# Patient Record
Sex: MALE | Race: WHITE | URBAN - METROPOLITAN AREA
[De-identification: names, ages, dates, MRNs, and addresses within clinical notes are randomized per-mention and may not be internally consistent; named-entity substitution may affect disease eponyms.]

---

## 2022-10-31 ENCOUNTER — OFFICE VISIT (OUTPATIENT)
Dept: ORTHOPEDIC SURGERY | Age: 62
End: 2022-10-31
Payer: COMMERCIAL

## 2022-10-31 VITALS — HEIGHT: 70 IN | BODY MASS INDEX: 33.36 KG/M2 | WEIGHT: 233 LBS

## 2022-10-31 DIAGNOSIS — M72.0 DUPUYTREN'S DISEASE OF PALM OF RIGHT HAND: ICD-10-CM

## 2022-10-31 DIAGNOSIS — M65.352 TRIGGER FINGER, LEFT LITTLE FINGER: Primary | ICD-10-CM

## 2022-10-31 PROCEDURE — 99203 OFFICE O/P NEW LOW 30 MIN: CPT | Performed by: ORTHOPAEDIC SURGERY

## 2022-10-31 PROCEDURE — 20550 NJX 1 TENDON SHEATH/LIGAMENT: CPT | Performed by: ORTHOPAEDIC SURGERY

## 2022-10-31 RX ORDER — BETAMETHASONE SODIUM PHOSPHATE AND BETAMETHASONE ACETATE 3; 3 MG/ML; MG/ML
6 INJECTION, SUSPENSION INTRA-ARTICULAR; INTRALESIONAL; INTRAMUSCULAR; SOFT TISSUE ONCE
Status: COMPLETED | OUTPATIENT
Start: 2022-10-31 | End: 2022-10-31

## 2022-10-31 RX ORDER — OLMESARTAN MEDOXOMIL 20 MG/1
TABLET ORAL
COMMUNITY
Start: 2022-10-13

## 2022-10-31 RX ORDER — BUPIVACAINE HYDROCHLORIDE 5 MG/ML
1 INJECTION, SOLUTION EPIDURAL; INTRACAUDAL ONCE
Status: COMPLETED | OUTPATIENT
Start: 2022-10-31 | End: 2022-10-31

## 2022-10-31 RX ORDER — SIMVASTATIN 40 MG/1
TABLET, FILM COATED ORAL
COMMUNITY
Start: 2022-10-13

## 2022-10-31 RX ADMIN — BETAMETHASONE SODIUM PHOSPHATE AND BETAMETHASONE ACETATE 6 MG: 3; 3 INJECTION, SUSPENSION INTRA-ARTICULAR; INTRALESIONAL; INTRAMUSCULAR; SOFT TISSUE at 08:31

## 2022-10-31 RX ADMIN — BUPIVACAINE HYDROCHLORIDE 5 MG: 5 INJECTION, SOLUTION EPIDURAL; INTRACAUDAL at 08:31

## 2022-10-31 NOTE — PATIENT INSTRUCTIONS
From the [id number] [facility]    Dupuytren's Contracture    What is Dupuytren's Contracture? Dupuytrens contracture is a disorder of the palm of the hand and fingers. In the normal hand there is a fibrous tissue called fascia. Fascia covers the important nerves, blood vessels, muscles, and tendons. Fascia also stabilizes the skin. In Dupuytrens disease, this fascia can become abnormal. It becomes thicker, forming cords. These cords are often mistaken for a tendon because they look and feel similar. Unlike a tendon that is moved by a muscle that shortens and lengthens, cord tissue is not connected to a muscle. Cord tissue is static and does not move. There may be a single cord or several. Cords can be separate or connected. Most people with Dupuytrens contracture will also have nodules or bumps in the hand. When they are first noticed, these nodules and cords may not change for a long time. They can also have a slow or fast change. Cords and nodules may become bigger and thicker over time. They may begin to pull the fingers into a bent (flexed) position so the fingers are bent toward the palm. This makes it impossible to fully open the fingers (Figures 1 and 2). This can become bothersome and limit use of the hand in many people. Figure 1  Advanced case of Dupuytren's Contracture with pits, nodules and cords leading to bending of the finger into the palm        Figure 2  Advanced case of Dupuytren's Contracture with pits, nodules and cords leading to bending of the finger into the palm        Figure 3  Table top test for Dupuytren's Contracture        Causes    The exact cause of Dupuytrens contracture is unknown and very complex. It is a hereditary disease. This means family history and ancestry play a role. The problem is more common in men, people over age 36, and people of [place]  descent. It is less common in [place] and [place] [facility] [place].  Smoking, diabetes, alcohol, lower body mass index, and aging are also all related to Dupuytrens. There is no evidence that hand injuries or specific jobs lead to a higher risk of developing Dupuytrens contracture. There may be a mild relationship to trauma in someone who is at risk. Occasionally after a distal radius (wrist) fracture, a patient may develop a single nodule in the palm. This nodule may or may not be tender. It often does not progress to result in a bent finger joint. Signs and Symptoms    Symptoms of Dupuytrens contracture usually include lumps, nodules, and bands or cords on the palmar side of the hands. The lumps are generally firm and stuck to the skin of the palm. Skin can seem thicker and puckered. Think of the Dupuytren's palm skin like a road. Some areas are swollen and puffy like a speed bump. In other areas the skin is puckered and pulled down like a pothole. Thick cords may develop from the palm into one or more fingers. These cords may cause bending of the fingers. The ring finger and little finger are most commonly involved. One or both hands can be affected. Each hand can be affected in a different pattern and at different times. The lumps can be uncomfortable in some people. However; in most people, Dupuytrens contracture is not typically painful. The disease may first be noticed due to difficulty in placing the hand flat on a surface (Figure 3) or opening the hand fully. It may be more difficult to wash hands, wear gloves, hold large objects, and get hands into pockets. When it involves the right hand, people can find it awkward to shake hands. It is difficult to predict how the disease will progress. Some people have only small lumps or cords while others will develop severely bent fingers. There are a variety of risk factors. The disease tends to be more severe if it occurs at an earlier age. Men develop more severe symptoms when compared to women.  If you have many relatives with the problem, you may be at higher risk for more severe disease. If there are changes in other areas of the body, you are at higher risk. This is called Dupuytrens diathesis. Lumps and cords can develop on the soles of the feet (plantar fibromatosis) or the genital location in men (Peyronies disease). Occasionally, the disease will cause thickening on top of the knuckles called a knuckle pad (or Garrods pad). Treatment    The presence of a lump in the palm does not mean that treatment is required or that the disease will progress. Also, not all lumps in the palm are Dupuytrens. Therefore, it can be helpful to see a hand surgeon for an examination. Sometimes a history and examination is all that is needed to evaluate a mass. Other times, imaging such as an x-ray, ultrasound, or MRI may be indicated. Some factors that are important in evaluating masses include their size, feel (such as firm or squishy), location, presence of pain, movement of mass or skin around the mass, family history, and other medical conditions that may be present. In mild cases, especially if hand function is good, only observation is needed. Splinting or stretching typically does not prevent worsening of the contracture but is safe to try. For contractures that become bothersome, there are nonsurgical and surgical options. These are typically discussed when the contracture prevents the hand from lying flat on a table. A hand surgeon (either a trained orthopaedic surgeon, plastic surgeon, or a general surgeon) can discuss the most appropriate method based upon the stage and pattern of the disease and the joints involved. The goal of treatment is to improve finger motion and function. Complete correction of the fingers may not always happen. Even with treatment, the disease is not fully curable. The nodules and cords may come back in the same or a different location.  Before treatment, the hand surgeon will discuss realistic goals and possible risks. It is important to make sure the patient will understand this problem and both short- and long-term expectations. Nonsurgical Treatment    One nonsurgical treatment option for Dupuytrens contracture is called needle fasciotomy (or needle aponeurotomy). This can be safely done in the medical office or a procedure room. The patient is usually fully awake. The hand and finger are injected with a numbing medicine. In a few minutes when the skin is numb, a needle is inserted below the skin to cut the cord in several locations. The physician then slowly stretches the hand and fingers to break the cord. This allows the hand to be straight again. It is not unusual to develop small tears of the skin that heal over a few weeks. The recovery is only a few days. The hand can be used as much as the patient is comfortable. The wounds must be protected to reduce risk of infection. This procedure does not remove the cord fully from below the skin. Roughly one third of patients will see the cord eventually come back after this procedure. Another office procedure option is a two-step procedure that includes a collagenase injection. This is similar to needle fasciotomy. Instead, it uses an injectable drug called collagenase to dissolve the cord. The physician will inject the cord at the first visit with the medication. Many patients will experience some swelling, bruising of the hand, and pain after the injection. To limit these symptoms the hand is often wrapped in a bulky dressing. The patient is also instructed to elevate the hand and limit use. Sometimes oral medications are advised such as acetaminophen, a non-steroidal anti-inflammatory drug. The second visit is usually one or a few days later. At that visit, the hand and fingers will be injected again. This time a numbing medication is placed for comfort.  A few minutes later, the physician will slowly stretch out and straighten the finger joints to break the cord. Some patients may develop small tears in the skin. Similar to needle fasciotomy, the recovery is only a few days to a few weeks. The hand can be used normally almost immediately once the swelling goes down and the numbing medication wears off. This does not remove the cord fully from below the skin, and one third of patients will see the cord come back. Talk to your doctor about your eligibility to receive this injection. Splinting and therapy may be used to help keep the hand and fingers straight after the treatment is completed. The main reason to undergo treatment is to increase your ability to straighten the finger. However, it is also very important to work on your ability to make a fist during recovery. Surgical Treatment    There are several surgical treatments for Dupuytrens contracture. Painful nodules can be treated by opening the skin and carefully removing them. There are two procedures to treat the cords. One is called a fasciotomy where the tight cords are cut but not removed. This is less invasive as only a small cut or cuts are used. Because all the tissue is not removed, there is possibly a higher chance the contracture could return. The other type of surgery involves both cutting the cords and removing as much of the nodule, cords, and even diseased skin in the hand. This is called a fasciectomy. This often has longer wounds with more extensive dissection. A surgical plan will be created that is specific to each patient. Surgery can be performed with the patient asleep or awake using a variety of anesthesia techniques. This is out-patient or same-day surgery. This means you will go home the same day. The skin is usually closed with stitches, but sometimes portions of the skin are left open to prevent any recurrence of the nodules. Because of the surface area cut, having blood on the dressings is normal at the first few dressing changes.  The hand and fingers are usually much improved in their ability to straighten. However, this improvement may not maintained forever. After surgery, splinting and hand therapy (either physical therapy or occupational therapy) are often very helpful in order to improve function of the affected finger. While surgical treatment may be more effective, the recovery is longer. The recovery time for fasciectomy surgery is usually about 6 weeks. The hand can be used during this time. It is helpful for patients to set aside time multiple times per day to perform exercises, use a splint, or attend therapy sessions. Waiting until weeks or months after surgery to begin therapy may result in stiffness. The more the patient does soon after surgery, the more successful the final result will be. From the [employer] of the Hand    Trigger Finger    What is Trigger Finger? A trigger finger is a very common and treatable problem. It can occur in both fingers and the thumbs, which have tendons that help them to bend. The flexor tendons that bend the fingers have a lining on the outside. This lining is called tenosynovium. The tendon and lining are covered by a series of thick, soft tissue called pulleys. The tendon and its lining are designed to glide through the pulleys without friction. The pulleys are similar to how a line is held on a fishing cira (Figure 1). A trigger finger, sometimes referred to as a trigger thumb or stenosing tenosynovitis, can occur if one of three things happen: 1. The tendon enlarges (does not fit through pulley well); 2. The lining increases in thickness (does not fit through pulley well); 3. the pulley becomes thicker (the opening for the tendon gets smaller). The finger tendon and pulley system is designed to have the exact right sizes of each structure. The change in size of any of the important finger structures can cause problems.  If the tendon becomes tight within the pulley, the lining gets squeezed and reacts with thickening. The bigger lining then produces more fluid. And the higher volume of fluid increases pressure. The undersurface of the pulley can also change and thicken. This thicker pulley causes friction on the moving tendon. This makes it difficult for the tendon to move back and forth (Figure 2). The good news is that trigger finger can be diagnosed by the history, symptoms, and a physical exam. It is rare to require other diagnostic testing. It is also helpful to know this problem has several very successful treatments. Figure 1        The pulley and tendon in a finger, gliding normally. Figure 2        As in trigger finger, if the pulley becomes too thick, the tendon cannot glide through it. Causes  Trigger fingers are more common with certain medical conditions. Rheumatoid arthritis, gout and diabetes are risk factors for this condition. Repeated and strong gripping may lead to the condition. In most cases, the cause of the trigger finger is not known. Signs and Symptoms    Some symptoms of trigger finger can include:    Pain: Trigger finger may start with discomfort felt at the base of the affected finger or thumb, where the finger joins the palm. This may be the only initial symptom. This pain occurs with pressure over the A1 pulley area. The pain is often only present with activity such as gripping. When at rest, it may not hurt. Over time, if there is increased fluid production in the tendon sheath, this may cause pressure and pain even without hand use. Swelling: Over time there may be the development of a lump at the A1 pulley. This can be due to a nodular swelling within the tendon or the development of a fluid filled cyst. The cyst is called a flexor sheath ganglion. Stiffness or loss of motion: A trigger finger may result in loss of the ability to bend the finger.  This can be estimated by how far the tip of the finger is from the palm of the hand when the patient is asked to bend the finger as much as they can. This is most common in chronic, untreated trigger fingers. It can be painful to try and bend the finger due to the compression of the fluid. Over time, the person may start to avoid a bent position of the finger to limit pain. Trigger fingers can also result in loss of the ability to straighten the finger. Some patients will feel pain trying to fully straighten. When the joint does not fully straighten for several weeks, a ligament called the volar plate becomes shortened and limits motion. Mechanical symptoms: A trigger finger can cause abnormal sensations or movement that are often described as popping, catching, or locking. Sometimes these abnormal sensations occur while bending or straightening the finger, or both. Early on, the symptoms may be mildly painful, but as the tendon and pulley interaction becomes tighter, the pain can increase. Treatment    Non-Surgical    The goal of treatment in a trigger finger is to reduce or eliminate the swelling and catching/locking, allowing full, painless movement of the finger or thumb. The ability to restore the finger to what the patient believes is normal or 100% is easier when the problem is diagnosed and treated as soon as possible. Common treatment options include, but are not limited to:    Splinting at night. It is estimated that much of the bodys fluid volume pools in the legs during the day when we sit and stand due to the effects of gravity. When someone lays flat at night, the effect of gravity on the legs is more similar to the arms, so fluid may shift from the legs to the arms. This may increase swelling in the fingers where pain and locking can be more frequent at night and the early morning. By using a night splint to keep the finger straight, it can prevent painful locking during sleep.  However, keeping the finger straight all night could result in the need to spend some time and effort getting it to move smoothly the next morning. Nonsteroidal anti-inflammatory drugs (NSAIDs). Many times, oral or topical anti-inflammatory medication (like ibuprofen or naproxen) can be tried to relieve pain and improve ability to move the finger through a large arc. Changing your activity. It may be possible to limit or space out the amount of time spent in forceful, repetitive, or sustained gripping. Steroid injection. Corticosteroid injections, also known as a cortisone shot, can be given at any stage of symptoms or duration. However, there may be better success when they are given early. Hand therapy. Patients may benefit from some supervised and home exercises. It can be helpful to have a hand therapist teach concepts and techniques such as passive joint motion, tendon differential tendon gliding, proximal joint blocking to isolate more distal joints, edema control, and other treatments. Surgical    If non-surgical treatments do not relieve the symptoms, surgery may be recommended. The goal of surgery is to open the pulley at the base of the finger so that the tendon can glide more freely. The clicking or popping goes away in most cases after cutting the A1 pulley. If there are still mechanical symptoms after a trigger finger release, a flexor tenosynovectomy can be considered. This procedure removes the thickened lining from the surface of the tendons. If there are still mechanical symptoms, then part of the superficial tendon can be removed to reduce the volume of tendon moving in and out of the rest of the pulley system. It is optimal if all the above surgical treatments can be performed during the same procedure. With surgical treatment, the chances of recognizing and treating all changes to the finger is improved when it is possible for the patient to be awake at the end of the procedure to follow instructions.  By having the patient able to actively bend and straighten their fingers several times, the surgeon can verify the mechanical symptoms are absent. Finger motion can return at different speeds depending on each patient and their unique timing of symptom development, when treatments begin, and the effectiveness of each type of treatment. Your orthopaedic hand surgeon will develop an individual treatment plan for you. There are different ways to perform the surgery. There are several different surgical techniques, anesthesia options, and locations where the procedure can occur. There can be some ongoing stiffness after hand surgery even if there is no more locking, and it may remain long-term. Therefore, hand therapy can be beneficial after surgery whether or not it was used before surgery. There may be some mild to moderate tenderness at the surgery area for up to several months after surgery. However, most patients resume their normal lifestyles within a few weeks.

## 2022-10-31 NOTE — PROGRESS NOTES
Avelino Saab (: 1960) is a 58 y.o. male patient here for evaluation of the following chief complaint(s):  Hand Pain (Right hand/left pinky)       ASSESSMENT/PLAN:  Below is the assessment and plan developed based on review of pertinent history, physical exam, labs, studies, and medications. 1. Trigger finger, left little finger  2. Dupuytren's disease of palm of right hand    59-year-old male with left small finger trigger and right middle finger palmar nodule Dupuytren's. He wanted to proceed with injection for the trigger. He tolerated this well. We discussed follow-up and would likely proceed with surgery but also could consider injection. Patient verbalized understanding and elected to proceed. All questions were answered to the patient's apparent satisfaction. SUBJECTIVE/OBJECTIVE:  HPI    59-year-old male with left small finger catching and locking. Also reports a nodule in his right hand. Patient reports a sudden onset of symptoms. Duration of problem 3 weeks. Symptom Severity 7/10  Symptom Frequency constant        No Known Allergies    Current Outpatient Medications   Medication Sig    olmesartan (BENICAR) 20 mg tablet     simvastatin (ZOCOR) 40 mg tablet      No current facility-administered medications for this visit.        Social History     Socioeconomic History    Marital status:      Spouse name: Not on file    Number of children: Not on file    Years of education: Not on file    Highest education level: Not on file   Occupational History    Not on file   Tobacco Use    Smoking status: Never     Passive exposure: Never    Smokeless tobacco: Never   Vaping Use    Vaping Use: Never used   Substance and Sexual Activity    Alcohol use: Yes     Comment: occasionally    Drug use: Never    Sexual activity: Not Currently   Other Topics Concern    Not on file   Social History Narrative    Not on file     Social Determinants of Health     Financial Resource Strain: Not on file   Food Insecurity: Not on file   Transportation Needs: Not on file   Physical Activity: Not on file   Stress: Not on file   Social Connections: Not on file   Intimate Partner Violence: Not on file   Housing Stability: Not on file       History reviewed. No pertinent surgical history. No family history on file. Review of Systems    No flowsheet data found. Vitals:  Ht 5' 10\" (1.778 m)   Wt 233 lb (105.7 kg)   BMI 33.43 kg/m²    Estimated body surface area is 2.28 meters squared as calculated from the following:    Height as of this encounter: 5' 10\" (1.778 m). Weight as of this encounter: 233 lb (105.7 kg). Body mass index is 33.43 kg/m². Physical Exam    Musculoskeletal Exam:    Left  Hand Evaluation    Patient has tenderness to palpation over the A1 pulley of the SF. Patient has catching, locking on examination. No other areas of tenderness to palpation are found. Patient fires AIN, PIN and ulnar nerves. Sensation is grossly intact in the median, radial and ulnar distribution. Hand is pink and appears well-perfused. Hand is warm. Skin is intact. Compartments are soft and compressible. Right hand has a palmar nodule consistent with Dupuytren's over the middle finger. Consitutional: Healthy  Skin:   - Edema - none  - Cellulitis - No    Neuro: Numbness or tingling in R/L arm: No    Psych: Affect normal    Cardiovascular: Capillary Refill < 2 seconds in upper extremities    Respiratory: Non-Labored Breathing    ROS:    Constitutional: Denies fever/chills    Respiratory: Denies SOB          Orders Placed This Encounter    betamethasone (CELESTONE) injection 6 mg    bupivacaine (PF) (MARCAINE) 0.5 % (5 mg/mL) injection 5 mg        Procedures:    Trigger Finger Injection:    We discussed the possibility of injection of a steroid mixed with a local anesthetic to relieve pain and decrease inflammation of the affected area.  The risks of a steroid injection which include but are not limited to cartilage damage, death of nearby bone, joint infection, nerve damage, temporary facial flushing, temporary steroid flare of pain and inflammation in the joint, temporary increase in blood sugar, tendon weakening or rupture, thinning of nearby bone (osteoporosis), thinning of skin and soft tissue around the injection site, and whitening or lightening of the skin around the injection site were reviewed. After explaining the risks and benefits of the procedure and obtaining informed consent from the patient, the proposed area for injection was confirmed with the patient. After all questions and concerns were addressed, the skin was prepped with alcohol to reduce the chances of infection. The skin was anesthetized with topical ethylene chloride spray. Next, the left small finger was injected with 1 cc of of 0.5% bupivacaine and 1cc of Betamethasone (6mg/mL). Patient tolerated the procedure well without any complications. The needle was removed and disposed of in a sterile container. The patient tolerated the injection well and a band-aid was placed on the skin. The patient was counseled on protecting the injection area, icing for pain relief and looking for signs and symptoms of infection. We requested that the patient contact us if any symptoms persist greater than 48 hours after the injection. An electronic signature was used to authenticate this note.   -- Melina Malhotra MD

## 2024-10-14 SDOH — HEALTH STABILITY: PHYSICAL HEALTH: ON AVERAGE, HOW MANY DAYS PER WEEK DO YOU ENGAGE IN MODERATE TO STRENUOUS EXERCISE (LIKE A BRISK WALK)?: 0 DAYS

## 2024-10-17 ENCOUNTER — OFFICE VISIT (OUTPATIENT)
Age: 64
End: 2024-10-17
Payer: COMMERCIAL

## 2024-10-17 VITALS
HEART RATE: 84 BPM | HEIGHT: 70 IN | WEIGHT: 240.8 LBS | OXYGEN SATURATION: 95 % | BODY MASS INDEX: 34.47 KG/M2 | SYSTOLIC BLOOD PRESSURE: 129 MMHG | RESPIRATION RATE: 18 BRPM | DIASTOLIC BLOOD PRESSURE: 75 MMHG

## 2024-10-17 DIAGNOSIS — M25.561 RIGHT KNEE PAIN, UNSPECIFIED CHRONICITY: ICD-10-CM

## 2024-10-17 DIAGNOSIS — M25.562 LEFT KNEE PAIN, UNSPECIFIED CHRONICITY: Primary | ICD-10-CM

## 2024-10-17 PROCEDURE — 3017F COLORECTAL CA SCREEN DOC REV: CPT | Performed by: ORTHOPAEDIC SURGERY

## 2024-10-17 PROCEDURE — 1036F TOBACCO NON-USER: CPT | Performed by: ORTHOPAEDIC SURGERY

## 2024-10-17 PROCEDURE — 99203 OFFICE O/P NEW LOW 30 MIN: CPT | Performed by: ORTHOPAEDIC SURGERY

## 2024-10-17 PROCEDURE — G8484 FLU IMMUNIZE NO ADMIN: HCPCS | Performed by: ORTHOPAEDIC SURGERY

## 2024-10-17 PROCEDURE — G8417 CALC BMI ABV UP PARAM F/U: HCPCS | Performed by: ORTHOPAEDIC SURGERY

## 2024-10-17 PROCEDURE — G8427 DOCREV CUR MEDS BY ELIG CLIN: HCPCS | Performed by: ORTHOPAEDIC SURGERY

## 2024-10-17 RX ORDER — MELOXICAM 15 MG/1
TABLET ORAL
COMMUNITY
Start: 2024-09-07

## 2024-10-17 ASSESSMENT — PATIENT HEALTH QUESTIONNAIRE - PHQ9
1. LITTLE INTEREST OR PLEASURE IN DOING THINGS: NOT AT ALL
2. FEELING DOWN, DEPRESSED OR HOPELESS: NOT AT ALL
SUM OF ALL RESPONSES TO PHQ QUESTIONS 1-9: 0
SUM OF ALL RESPONSES TO PHQ9 QUESTIONS 1 & 2: 0
SUM OF ALL RESPONSES TO PHQ QUESTIONS 1-9: 0

## 2024-10-17 NOTE — PROGRESS NOTES
Identified pt with two pt identifiers (name and ). Reviewed chart in preparation for visit and have obtained necessary documentation.    Epi Araiza is a 64 y.o. male  Chief Complaint   Patient presents with    Knee Pain     Pt states he's experiencing Left knee pain.     /75   Pulse 84   Resp 18   Ht 1.778 m (5' 10\")   Wt 109.2 kg (240 lb 12.8 oz)   SpO2 95%   BMI 34.55 kg/m²     1. Have you been to the ER, urgent care clinic since your last visit?  Hospitalized since your last visit?no    2. Have you seen or consulted any other health care providers outside of the [facility] since your last visit?  Include any pap smears or colon screening. No  BP elevated. Patient advised to  follow up with PCP and check BP twice a day at home.

## 2024-10-17 NOTE — PROGRESS NOTES
10/17/2024      CC: Left knee pain    HPI:      This is a 64 y.o. year old patient who complains of left knee pain, this is on the medial aspect of the knee, it has been present for six weeks. Onset was sudden with a pop after lifting mulch.  This pain is activity dependent, it causes a significant amount of difficulty with athletic activities and stairs.   The patient complains mechanical symptoms and clicking within the knee.       PMH:  History reviewed. No pertinent past medical history.    PSxHx:  History reviewed. No pertinent surgical history.    Meds:    Current Outpatient Medications:     meloxicam (MOBIC) 15 MG tablet, , Disp: , Rfl:     olmesartan (BENICAR) 20 MG tablet, ceived the following from [facility]: Outside name: olmesartan (BENICAR) 20 mg tablet, Disp: , Rfl:     simvastatin (ZOCOR) 40 MG tablet, ceived the following from [facility]: Outside name: simvastatin (ZOCOR) 40 mg tablet, Disp: , Rfl:     All:  No Known Allergies    Social Hx:  Social History     Socioeconomic History    Marital status:      Spouse name: None    Number of children: None    Years of education: None    Highest education level: None   Tobacco Use    Smoking status: Never    Smokeless tobacco: Never   Substance and Sexual Activity    Alcohol use: Yes    Drug use: Never     Social Determinants of Health     Physical Activity: Unknown (10/14/2024)    Exercise Vital Sign     Days of Exercise per Week: 0 days       Family Hx:  History reviewed. No pertinent family history.      Review of Systems:       General: Denies headache, lethargy, fever, weight loss  Ears/Nose/Throat: Denies ear discharge, drainage, nosebleeds, hoarse voice, dental problems  Cardiovascular: Denies chest pain, shortness of breath  Lungs: Denies chest pain, breathing problems, wheezing, pneumonia  Stomach: Denies stomach pain, heartburn, constipation, irritable bowel  Skin: Denies rash, sores, open

## 2024-10-28 ENCOUNTER — HOSPITAL ENCOUNTER (OUTPATIENT)
Facility: HOSPITAL | Age: 64
Discharge: HOME | End: 2024-10-31
Attending: ORTHOPAEDIC SURGERY
Payer: COMMERCIAL

## 2024-10-28 DIAGNOSIS — M25.562 LEFT KNEE PAIN, UNSPECIFIED CHRONICITY: ICD-10-CM

## 2024-10-28 PROCEDURE — 73721 MRI JNT OF LWR EXTRE W/O DYE: CPT

## 2024-10-30 ENCOUNTER — OFFICE VISIT (OUTPATIENT)
Age: 64
End: 2024-10-30
Payer: COMMERCIAL

## 2024-10-30 DIAGNOSIS — M25.561 RIGHT KNEE PAIN, UNSPECIFIED CHRONICITY: Primary | ICD-10-CM

## 2024-10-30 PROCEDURE — 99213 OFFICE O/P EST LOW 20 MIN: CPT | Performed by: ORTHOPAEDIC SURGERY

## 2024-10-30 PROCEDURE — 1036F TOBACCO NON-USER: CPT | Performed by: ORTHOPAEDIC SURGERY

## 2024-10-30 PROCEDURE — 3017F COLORECTAL CA SCREEN DOC REV: CPT | Performed by: ORTHOPAEDIC SURGERY

## 2024-10-30 PROCEDURE — G8417 CALC BMI ABV UP PARAM F/U: HCPCS | Performed by: ORTHOPAEDIC SURGERY

## 2024-10-30 PROCEDURE — G8427 DOCREV CUR MEDS BY ELIG CLIN: HCPCS | Performed by: ORTHOPAEDIC SURGERY

## 2024-10-30 PROCEDURE — G8484 FLU IMMUNIZE NO ADMIN: HCPCS | Performed by: ORTHOPAEDIC SURGERY

## 2024-10-30 RX ORDER — DICLOFENAC SODIUM 75 MG/1
75 TABLET, DELAYED RELEASE ORAL 2 TIMES DAILY PRN
Qty: 60 TABLET | Refills: 2 | Status: SHIPPED | OUTPATIENT
Start: 2024-10-30

## 2024-10-30 ASSESSMENT — PATIENT HEALTH QUESTIONNAIRE - PHQ9
2. FEELING DOWN, DEPRESSED OR HOPELESS: NOT AT ALL
SUM OF ALL RESPONSES TO PHQ QUESTIONS 1-9: 0
SUM OF ALL RESPONSES TO PHQ9 QUESTIONS 1 & 2: 0
SUM OF ALL RESPONSES TO PHQ QUESTIONS 1-9: 0
1. LITTLE INTEREST OR PLEASURE IN DOING THINGS: NOT AT ALL
SUM OF ALL RESPONSES TO PHQ QUESTIONS 1-9: 0
SUM OF ALL RESPONSES TO PHQ QUESTIONS 1-9: 0

## 2024-10-31 NOTE — PROGRESS NOTES
10/31/2024      CC: left knee pain    HPI:      This is a 64 y.o. year old patient who presents for MRI follow up of the left knee.   The patient states their pain is still consistent per the previous visit.        PMH:  History reviewed. No pertinent past medical history.    PSxHx:  History reviewed. No pertinent surgical history.    Meds:    Current Outpatient Medications:     diclofenac (VOLTAREN) 75 MG EC tablet, Take 1 tablet by mouth 2 times daily as needed for Pain, Disp: 60 tablet, Rfl: 2    meloxicam (MOBIC) 15 MG tablet, , Disp: , Rfl:     olmesartan (BENICAR) 20 MG tablet, ceived the following from [facility]: Outside name: olmesartan (BENICAR) 20 mg tablet, Disp: , Rfl:     simvastatin (ZOCOR) 40 MG tablet, ceived the following from [facility]: Outside name: simvastatin (ZOCOR) 40 mg tablet, Disp: , Rfl:     All:  No Known Allergies    Social Hx:  Social History     Socioeconomic History    Marital status:      Spouse name: None    Number of children: None    Years of education: None    Highest education level: None   Tobacco Use    Smoking status: Never    Smokeless tobacco: Never   Substance and Sexual Activity    Alcohol use: Yes    Drug use: Never     Social Determinants of Health     Physical Activity: Unknown (10/14/2024)    Exercise Vital Sign     Days of Exercise per Week: 0 days       Family Hx:  History reviewed. No pertinent family history.      Review of Systems:       General: Denies headache, lethargy, fever, weight loss  Ears/Nose/Throat: Denies ear discharge, drainage, nosebleeds, hoarse voice, dental problems  Cardiovascular: Denies chest pain, shortness of breath  Lungs: Denies chest pain, breathing problems, wheezing, pneumonia  Stomach: Denies stomach pain, heartburn, constipation, irritable bowel  Skin: Denies rash, sores, open wounds  Musculoskeletal: left knee pain  Genitourinary: Denies dysuria, hematuria, polyuria  Gastrointestinal: Denies

## 2024-12-04 ENCOUNTER — OFFICE VISIT (OUTPATIENT)
Age: 64
End: 2024-12-04
Payer: COMMERCIAL

## 2024-12-04 VITALS — WEIGHT: 250 LBS | BODY MASS INDEX: 35.79 KG/M2 | HEIGHT: 70 IN

## 2024-12-04 DIAGNOSIS — M25.562 LEFT KNEE PAIN, UNSPECIFIED CHRONICITY: Primary | ICD-10-CM

## 2024-12-04 PROCEDURE — G8417 CALC BMI ABV UP PARAM F/U: HCPCS | Performed by: ORTHOPAEDIC SURGERY

## 2024-12-04 PROCEDURE — 3017F COLORECTAL CA SCREEN DOC REV: CPT | Performed by: ORTHOPAEDIC SURGERY

## 2024-12-04 PROCEDURE — 1036F TOBACCO NON-USER: CPT | Performed by: ORTHOPAEDIC SURGERY

## 2024-12-04 PROCEDURE — G8484 FLU IMMUNIZE NO ADMIN: HCPCS | Performed by: ORTHOPAEDIC SURGERY

## 2024-12-04 PROCEDURE — 99213 OFFICE O/P EST LOW 20 MIN: CPT | Performed by: ORTHOPAEDIC SURGERY

## 2024-12-04 PROCEDURE — 20610 DRAIN/INJ JOINT/BURSA W/O US: CPT | Performed by: ORTHOPAEDIC SURGERY

## 2024-12-04 PROCEDURE — G8427 DOCREV CUR MEDS BY ELIG CLIN: HCPCS | Performed by: ORTHOPAEDIC SURGERY

## 2024-12-04 RX ORDER — BETAMETHASONE SODIUM PHOSPHATE AND BETAMETHASONE ACETATE 3; 3 MG/ML; MG/ML
6 INJECTION, SUSPENSION INTRA-ARTICULAR; INTRALESIONAL; INTRAMUSCULAR; SOFT TISSUE ONCE
Status: COMPLETED | OUTPATIENT
Start: 2024-12-04 | End: 2024-12-04

## 2024-12-04 RX ADMIN — BETAMETHASONE SODIUM PHOSPHATE AND BETAMETHASONE ACETATE 6 MG: 3; 3 INJECTION, SUSPENSION INTRA-ARTICULAR; INTRALESIONAL; INTRAMUSCULAR; SOFT TISSUE at 15:17

## 2024-12-04 ASSESSMENT — PATIENT HEALTH QUESTIONNAIRE - PHQ9
1. LITTLE INTEREST OR PLEASURE IN DOING THINGS: NOT AT ALL
2. FEELING DOWN, DEPRESSED OR HOPELESS: NOT AT ALL
SUM OF ALL RESPONSES TO PHQ QUESTIONS 1-9: 0
SUM OF ALL RESPONSES TO PHQ9 QUESTIONS 1 & 2: 0

## 2024-12-04 NOTE — PROGRESS NOTES
12/4/2024      CC: left knee pain    HPI:      This is a 64 y.o. year old male who presents for a follow up visit.  The patient was last seen and diagnosed with left knee meniscus tear versus osteoarthritis.   The patient's treatments since the most recent visit have comprised of diclofenac, bracing.   The patient has had moderate relief of the chief complaint.        PMH:  History reviewed. No pertinent past medical history.    PSxHx:  History reviewed. No pertinent surgical history.    Meds:    Current Outpatient Medications:     diclofenac (VOLTAREN) 75 MG EC tablet, Take 1 tablet by mouth 2 times daily as needed for Pain, Disp: 60 tablet, Rfl: 2    olmesartan (BENICAR) 20 MG tablet, ceived the following from [facility]: Outside name: olmesartan (BENICAR) 20 mg tablet, Disp: , Rfl:     meloxicam (MOBIC) 15 MG tablet, , Disp: , Rfl:     simvastatin (ZOCOR) 40 MG tablet, ceived the following from [facility]: Outside name: simvastatin (ZOCOR) 40 mg tablet, Disp: , Rfl:     All:  No Known Allergies    Social Hx:  Social History     Socioeconomic History    Marital status:      Spouse name: None    Number of children: None    Years of education: None    Highest education level: None   Tobacco Use    Smoking status: Never    Smokeless tobacco: Never   Substance and Sexual Activity    Alcohol use: Yes    Drug use: Never     Social Determinants of Health     Physical Activity: Unknown (10/14/2024)    Exercise Vital Sign     Days of Exercise per Week: 0 days       Family Hx:  History reviewed. No pertinent family history.      Review of Systems:       General: Denies headache, lethargy, fever, weight loss  Ears/Nose/Throat: Denies ear discharge, drainage, nosebleeds, hoarse voice, dental problems  Cardiovascular: Denies chest pain, shortness of breath  Lungs: Denies chest pain, breathing problems, wheezing, pneumonia  Stomach: Denies stomach pain, heartburn, constipation, irritable

## 2024-12-04 NOTE — PROGRESS NOTES
Identified pt with two pt identifiers (name and ). Reviewed chart in preparation for visit and have obtained necessary documentation.    Epi Araiza is a 64 y.o. male  Chief Complaint   Patient presents with    Follow-up     FOLLOW UP KNEE - 5 week left knee       Ht 1.778 m (5' 10\")   Wt 113.4 kg (250 lb)   BMI 35.87 kg/m²     1. Have you been to the ER, urgent care clinic since your last visit?  Hospitalized since your last visit?no    2. Have you seen or consulted any other health care providers outside of the [facility] since your last visit?  Include any pap smears or colon screening. no